# Patient Record
Sex: MALE | Race: WHITE | Employment: PART TIME | URBAN - METROPOLITAN AREA
[De-identification: names, ages, dates, MRNs, and addresses within clinical notes are randomized per-mention and may not be internally consistent; named-entity substitution may affect disease eponyms.]

---

## 2022-08-30 ENCOUNTER — HOSPITAL ENCOUNTER (EMERGENCY)
Age: 47
Discharge: PSYCHIATRIC OR SUBSTANCE USE FACILITY | End: 2022-08-30
Attending: EMERGENCY MEDICINE

## 2022-08-30 VITALS
HEIGHT: 76 IN | RESPIRATION RATE: 18 BRPM | WEIGHT: 307 LBS | DIASTOLIC BLOOD PRESSURE: 56 MMHG | TEMPERATURE: 98.5 F | OXYGEN SATURATION: 97 % | HEART RATE: 66 BPM | BODY MASS INDEX: 37.38 KG/M2 | SYSTOLIC BLOOD PRESSURE: 120 MMHG

## 2022-08-30 DIAGNOSIS — F31.81 BIPOLAR II DISORDER (HCC): Primary | ICD-10-CM

## 2022-08-30 DIAGNOSIS — R45.851 SUICIDAL THOUGHTS: ICD-10-CM

## 2022-08-30 DIAGNOSIS — I10 ESSENTIAL HYPERTENSION: ICD-10-CM

## 2022-08-30 DIAGNOSIS — F41.0 PANIC DISORDER WITHOUT AGORAPHOBIA: ICD-10-CM

## 2022-08-30 LAB
ALBUMIN SERPL-MCNC: 4.4 G/DL (ref 3.5–5)
ALBUMIN/GLOB SERPL: 1.3 {RATIO} (ref 1.2–3.5)
ALP SERPL-CCNC: 76 U/L (ref 50–136)
ALT SERPL-CCNC: 28 U/L (ref 12–65)
AMPHET UR QL SCN: NEGATIVE
ANION GAP SERPL CALC-SCNC: 5 MMOL/L (ref 7–16)
APPEARANCE UR: CLEAR
AST SERPL-CCNC: 18 U/L (ref 15–37)
BARBITURATES UR QL SCN: NEGATIVE
BASOPHILS # BLD: 0 K/UL (ref 0–0.2)
BASOPHILS NFR BLD: 1 % (ref 0–2)
BENZODIAZ UR QL: NEGATIVE
BILIRUB SERPL-MCNC: 0.7 MG/DL (ref 0.2–1.1)
BILIRUB UR QL: NEGATIVE
BUN SERPL-MCNC: 15 MG/DL (ref 6–23)
CALCIUM SERPL-MCNC: 9.4 MG/DL (ref 8.3–10.4)
CANNABINOIDS UR QL SCN: NEGATIVE
CHLORIDE SERPL-SCNC: 107 MMOL/L (ref 98–107)
CO2 SERPL-SCNC: 28 MMOL/L (ref 21–32)
COCAINE UR QL SCN: NEGATIVE
COLOR UR: ABNORMAL
CREAT SERPL-MCNC: 1.04 MG/DL (ref 0.8–1.5)
DIFFERENTIAL METHOD BLD: ABNORMAL
EOSINOPHIL # BLD: 0 K/UL (ref 0–0.8)
EOSINOPHIL NFR BLD: 1 % (ref 0.5–7.8)
ERYTHROCYTE [DISTWIDTH] IN BLOOD BY AUTOMATED COUNT: 13 % (ref 11.9–14.6)
ETHANOL SERPL-MCNC: <3 MG/DL (ref 0–0.08)
GLOBULIN SER CALC-MCNC: 3.5 G/DL (ref 2.3–3.5)
GLUCOSE SERPL-MCNC: 96 MG/DL (ref 65–100)
GLUCOSE UR STRIP.AUTO-MCNC: NEGATIVE MG/DL
HCT VFR BLD AUTO: 50.8 % (ref 41.1–50.3)
HGB BLD-MCNC: 17.2 G/DL (ref 13.6–17.2)
HGB UR QL STRIP: NEGATIVE
IMM GRANULOCYTES # BLD AUTO: 0 K/UL (ref 0–0.5)
IMM GRANULOCYTES NFR BLD AUTO: 0 % (ref 0–5)
KETONES UR QL STRIP.AUTO: 15 MG/DL
LEUKOCYTE ESTERASE UR QL STRIP.AUTO: NEGATIVE
LYMPHOCYTES # BLD: 1.4 K/UL (ref 0.5–4.6)
LYMPHOCYTES NFR BLD: 19 % (ref 13–44)
MCH RBC QN AUTO: 30.7 PG (ref 26.1–32.9)
MCHC RBC AUTO-ENTMCNC: 33.9 G/DL (ref 31.4–35)
MCV RBC AUTO: 90.6 FL (ref 79.6–97.8)
METHADONE UR QL: NEGATIVE
MONOCYTES # BLD: 0.4 K/UL (ref 0.1–1.3)
MONOCYTES NFR BLD: 6 % (ref 4–12)
NEUTS SEG # BLD: 5.4 K/UL (ref 1.7–8.2)
NEUTS SEG NFR BLD: 74 % (ref 43–78)
NITRITE UR QL STRIP.AUTO: NEGATIVE
NRBC # BLD: 0 K/UL (ref 0–0.2)
OPIATES UR QL: NEGATIVE
PCP UR QL: NEGATIVE
PH UR STRIP: 7 [PH] (ref 5–9)
PLATELET # BLD AUTO: 266 K/UL (ref 150–450)
PMV BLD AUTO: 9.1 FL (ref 9.4–12.3)
POTASSIUM SERPL-SCNC: 4 MMOL/L (ref 3.5–5.1)
PROT SERPL-MCNC: 7.9 G/DL (ref 6.3–8.2)
PROT UR STRIP-MCNC: NEGATIVE MG/DL
RBC # BLD AUTO: 5.61 M/UL (ref 4.23–5.6)
SARS-COV-2 RDRP RESP QL NAA+PROBE: NOT DETECTED
SODIUM SERPL-SCNC: 140 MMOL/L (ref 136–145)
SOURCE: NORMAL
SP GR UR REFRACTOMETRY: 1.02 (ref 1–1.02)
UROBILINOGEN UR QL STRIP.AUTO: 1 EU/DL (ref 0.2–1)
WBC # BLD AUTO: 7.3 K/UL (ref 4.3–11.1)

## 2022-08-30 PROCEDURE — 6370000000 HC RX 637 (ALT 250 FOR IP): Performed by: EMERGENCY MEDICINE

## 2022-08-30 PROCEDURE — 80053 COMPREHEN METABOLIC PANEL: CPT

## 2022-08-30 PROCEDURE — 80307 DRUG TEST PRSMV CHEM ANLYZR: CPT

## 2022-08-30 PROCEDURE — 81003 URINALYSIS AUTO W/O SCOPE: CPT

## 2022-08-30 PROCEDURE — 93005 ELECTROCARDIOGRAM TRACING: CPT | Performed by: EMERGENCY MEDICINE

## 2022-08-30 PROCEDURE — 99285 EMERGENCY DEPT VISIT HI MDM: CPT

## 2022-08-30 PROCEDURE — 82077 ASSAY SPEC XCP UR&BREATH IA: CPT

## 2022-08-30 PROCEDURE — 87635 SARS-COV-2 COVID-19 AMP PRB: CPT

## 2022-08-30 PROCEDURE — 85025 COMPLETE CBC W/AUTO DIFF WBC: CPT

## 2022-08-30 RX ORDER — CLONAZEPAM 1 MG/1
0.5 TABLET ORAL NIGHTLY
Status: DISCONTINUED | OUTPATIENT
Start: 2022-08-30 | End: 2022-08-30 | Stop reason: HOSPADM

## 2022-08-30 RX ORDER — HYDROXYZINE HYDROCHLORIDE 10 MG/1
10 TABLET, FILM COATED ORAL 3 TIMES DAILY PRN
COMMUNITY

## 2022-08-30 RX ORDER — OLANZAPINE 2.5 MG/1
2.5 TABLET ORAL
Status: COMPLETED | OUTPATIENT
Start: 2022-08-30 | End: 2022-08-30

## 2022-08-30 RX ORDER — OLANZAPINE 2.5 MG/1
2.5 TABLET ORAL NIGHTLY
Status: DISCONTINUED | OUTPATIENT
Start: 2022-08-30 | End: 2022-08-30 | Stop reason: HOSPADM

## 2022-08-30 RX ORDER — METHYLPHENIDATE HYDROCHLORIDE 5 MG/1
20 TABLET ORAL
Status: DISCONTINUED | OUTPATIENT
Start: 2022-09-01 | End: 2022-08-30 | Stop reason: HOSPADM

## 2022-08-30 RX ORDER — FLUOXETINE HYDROCHLORIDE 20 MG/1
20 CAPSULE ORAL DAILY
Status: DISCONTINUED | OUTPATIENT
Start: 2022-08-31 | End: 2022-08-30 | Stop reason: HOSPADM

## 2022-08-30 RX ORDER — FLUOXETINE 10 MG/1
50 TABLET, FILM COATED ORAL DAILY
COMMUNITY

## 2022-08-30 RX ORDER — FLUOXETINE HYDROCHLORIDE 20 MG/1
20 CAPSULE ORAL ONCE
Status: COMPLETED | OUTPATIENT
Start: 2022-08-30 | End: 2022-08-30

## 2022-08-30 RX ORDER — METHYLPHENIDATE HYDROCHLORIDE 5 MG/1
10 TABLET ORAL EVERY MORNING
Status: DISCONTINUED | OUTPATIENT
Start: 2022-09-01 | End: 2022-08-30 | Stop reason: HOSPADM

## 2022-08-30 RX ORDER — METHYLPHENIDATE HYDROCHLORIDE 5 MG/1
10 TABLET ORAL DAILY
Status: DISCONTINUED | OUTPATIENT
Start: 2022-09-01 | End: 2022-08-30 | Stop reason: HOSPADM

## 2022-08-30 RX ORDER — CLONAZEPAM 1 MG/1
0.5 TABLET ORAL EVERY 12 HOURS PRN
Status: DISCONTINUED | OUTPATIENT
Start: 2022-08-30 | End: 2022-08-30

## 2022-08-30 RX ORDER — ALPRAZOLAM 0.5 MG/1
0.5 TABLET ORAL
Status: COMPLETED | OUTPATIENT
Start: 2022-08-30 | End: 2022-08-30

## 2022-08-30 RX ADMIN — OLANZAPINE 2.5 MG: 2.5 TABLET, FILM COATED ORAL at 14:33

## 2022-08-30 RX ADMIN — FLUOXETINE 20 MG: 20 CAPSULE ORAL at 14:33

## 2022-08-30 RX ADMIN — ALPRAZOLAM 0.5 MG: 0.5 TABLET ORAL at 12:37

## 2022-08-30 ASSESSMENT — PAIN DESCRIPTION - DESCRIPTORS: DESCRIPTORS: TIGHTNESS

## 2022-08-30 ASSESSMENT — PATIENT HEALTH QUESTIONNAIRE - PHQ9: SUM OF ALL RESPONSES TO PHQ QUESTIONS 1-9: 24

## 2022-08-30 ASSESSMENT — PAIN DESCRIPTION - LOCATION: LOCATION: CHEST

## 2022-08-30 ASSESSMENT — PAIN DESCRIPTION - ORIENTATION: ORIENTATION: MID

## 2022-08-30 ASSESSMENT — PAIN SCALES - GENERAL: PAINLEVEL_OUTOF10: 3

## 2022-08-30 NOTE — CARE COORDINATION
Per staff pt is here b/c he was not feeling well while at [facility] and they had tested him for COVID. He was positive and they told him that he needed to quarantine for 5 days \"elsewhere\". Since pt is from [place] and has no family here he had no where else to go. In speaking with Mariam Delgado, another roommate had tested positive and they are testing them now. If there entire room is positive, then he would check with management to see if they could confine those 4 men to there apartment. Also, Omari SINHA is looking into his options. I called the [address] and they are full and would not have a stop for a +covid pt if they did have a bed.

## 2022-08-30 NOTE — ED NOTES
Patient has had consistent thought about hurting himself. He has a plan. He was going to go to [facility] for work by himself he didn't think he could keep himself from shooting himself, he has access to a gun.       Brian Young RN  08/30/22 1015

## 2022-08-30 NOTE — ED NOTES
[facility] consult entered.  [address]  08/30/22 9362

## 2022-08-30 NOTE — ED NOTES
TRANSFER - OUT REPORT:    Verbal report given to Deanna on Miriam Lava  being transferred to [facility] for routine progression of patient care       Report consisted of patient's Situation, Background, Assessment and   Recommendations(SBAR). Information from the following report(s) Nurse Handoff Report was reviewed with the receiving nurse. Lines:       Opportunity for questions and clarification was provided.       Patient transported with:  Rell Chairez RN  08/30/22 6844

## 2022-08-30 NOTE — ED PROVIDER NOTES
Assumed care of patient at 1600 hrs.     Patient is excepted at [facility]  Dr. Niurka Couch    Patient is currently on involuntary commitment papers         Justin Flores MD  08/30/22 6680

## 2022-08-30 NOTE — ED PROVIDER NOTES
[employer] Emergency Department Provider Note                   PCP:                None Provider               Age: 41 y.o. Sex: male       ICD-10-CM    1. Bipolar II disorder ([facility] [address]  F31.81       2. Panic disorder without agoraphobia  F41.0       3. Essential hypertension  I10       4.  Suicidal thoughts  R45.851           Esther Roberts 16 08/30/2022 11:17:32 AM       MDM  Number of Diagnoses or Management Options  Bipolar II disorder ([facility] [address]  Essential hypertension  Panic disorder without agoraphobia  Suicidal thoughts  Diagnosis management comments: Suicidal ideations, homicidal ideations, self-inflicted injury,    Schizophrenia, schizoaffective disorder, personality disorder, major depression,   depression with anxiety, depression reactive to situation, depression, anxiety, panic  attack, hyperventilation syndrome, bipolar disorder,    Substance abuse, medication noncompliance, drug abuse, alcohol abuse, alcohol  intoxication,    Eating disorder, bulimia nervosa, anorexia nervosa, adjustment reaction         Amount and/or Complexity of Data Reviewed  Clinical lab tests: ordered and reviewed  Tests in the medicine section of CPT®: reviewed and ordered  Review and summarize past medical records: yes         Orders Placed This Encounter   Procedures    Critical Care    COVID-19, Rapid    Comprehensive Metabolic Panel    CBC with Auto Differential    Urinalysis w rflx microscopic    Urine Drug Screen    Ethanol    EKG 12 Lead        Medications   FLUoxetine (PROZAC) capsule 20 mg (has no administration in time range)   OLANZapine (ZYPREXA) tablet 2.5 mg (has no administration in time range)   OLANZapine (ZYPREXA) tablet 2.5 mg (has no administration in time range)   FLUoxetine (PROZAC) capsule 20 mg (has no administration in time range)   clonazePAM (KLONOPIN) tablet 0.5 mg (has no administration in time range)   methylphenidate (RITALIN) tablet 10 mg (has no administration in time range) methylphenidate (RITALIN) tablet 20 mg (has no administration in time range)   methylphenidate (RITALIN) tablet 10 mg (has no administration in time range)   ALPRAZolam Jones Latus) tablet 0.5 mg (0.5 mg Oral Given 8/30/22 1237)       New Prescriptions    No medications on file        Karla Kowalski is a 41 y.o. male who presents to the Emergency Department with chief complaint of    Chief Complaint   Patient presents with    Mental Health Problem      30-year-old male presenting to the emergency department today complaining of persistent daily thoughts of killing himself. The patient has had a long history of depression and in his 25s he had a history of drug abuse and tried to overdose 1 time. The patient states that he was placed on Prozac in November but it has not seemed to help. He has been on Celexa previously but that has been several years ago. He denies any tobacco alcohol or recreational drug use currently. He has had several life event changes in the last couple of years and the additional stress of the pandemic with isolation has made things worse as well. All other systems reviewed and are negative unless otherwise stated in the history of present illness section. Review of Systems   Psychiatric/Behavioral:  Positive for self-injury. The patient is nervous/anxious. All other systems reviewed and are negative. Past Medical History:   Diagnosis Date    Depression         History reviewed. No pertinent surgical history. History reviewed. No pertinent family history. Social History     Socioeconomic History    Marital status:      Spouse name: None    Number of children: None    Years of education: None    Highest education level: None        Allergies: Patient has no known allergies.     Previous Medications    FLUOXETINE (PROZAC) 10 MG TABLET    Take 50 mg by mouth daily    HYDROXYZINE HCL (ATARAX) 10 MG TABLET    Take 10 mg by mouth 3 times daily as needed for Itching Vitals signs and nursing note reviewed. Patient Vitals for the past 4 hrs:   Temp Pulse Resp BP SpO2   08/30/22 1042 -- -- -- (!) 157/82 95 %   08/30/22 1041 -- 66 -- -- --   08/30/22 1010 98.5 °F (36.9 °C) 88 18 (!) 156/98 97 %          Physical Exam     GENERAL:The patient has Body mass index is 37.37 kg/m². Well-hydrated. VITAL SIGNS: Heart rate, blood pressure, respiratory rate reviewed as recorded in  nurse's notes  EYES: Pupils reactive. Extraocular motion intact. No conjunctival redness or drainage. EARS: No external masses or lesions. NOSE: No nasal drainage or epistaxis. MOUTH/THROAT: Pharynx clear; airway patent. NECK: Supple, no meningeal signs. Trachea midline. No masses or thyromegaly. LUNGS: Breath sounds clear and equal bilaterally no accessory muscle use. CHEST: No deformity  CARDIOVASCULAR: Regular rate and rhythm  EXTREMITIES: No clubbing or cyanosis. No joint swelling. Normal muscle tone. No  restricted range of motion appreciated. NEUROLOGIC: Sensation is grossly intact. Cranial nerve exam reveals face is  symmetrical, tongue is midline speech is clear. SKIN: No rash or petechiae. Good skin turgor palpated. PSYCHIATRIC: Alert and oriented. Tearful expressing thoughts of self-harm. Critical Care    Date/Time: 8/30/2022 1:55 PM  Performed by: Piter Pro DO  Authorized by: Piter Pro DO     Comments:      Critical care time: 79 minutes of critical care time was performed in the emergency department. This was separate from any other procedures listed during the patients emergency department course. The failure to initiate these interventions on an urgent basis would likely have resulted in sudden, clinically significant or life-threatening deterioration in the patients condition.       ED EKG Interpretation  EKG was interpreted in the absence of a cardiologist.    Rate: Rate: Bradycardia  EKG Interpretation: EKG Interpretation: sinus rhythm  ST Segments: Normal ST segments - NO STEMI    [unfilled]     No orders to display                      ED Course as of 08/30/22 1599   e Aug 30, 2022   1337 Dr. Lorraine Cotton talked to the patient and recommends inpatient care. Patient is agreeable with inpatient psychiatric treatment. He recommended Prozac 20 mg daily and Zyprexa 2.5 mg daily. Patient is also to be given Klonopin 0.5 mg every 12 as needed [KH]      ED Course User Index  [KH] Melanie Reynoso DO        Voice dictation software was used during the making of this note. This software is not perfect and grammatical and other typographical errors may be present. This note has not been completely proofread for errors.        Melanie Reynoso DO  08/30/22 3179

## 2022-08-30 NOTE — ED NOTES
ED provider Merline Alexander DO,  indicates no sitter needed at this time.       Kassy Barber RN  08/30/22 5151

## 2022-08-31 LAB
EKG ATRIAL RATE: 56 BPM
EKG DIAGNOSIS: NORMAL
EKG P AXIS: 43 DEGREES
EKG P-R INTERVAL: 128 MS
EKG Q-T INTERVAL: 624 MS
EKG QRS DURATION: 78 MS
EKG QTC CALCULATION (BAZETT): 602 MS
EKG R AXIS: 19 DEGREES
EKG T AXIS: 31 DEGREES
EKG VENTRICULAR RATE: 56 BPM

## 2022-08-31 NOTE — ED NOTES
Constant Observer No   Constant Observer Oriented no   High risk patients are in line of sight at all times No - moderate risk    Excess equipment/medical supplies not necessary for the care of the patient removed No - moderate risk    All sharp or dangerous objects are removed from room: including but not limited to belts, pens & pencils, needles, medications, cosmetics, lighters, matches, nail files, watches, necklaces, glass objects, razors, razor blades, knives, aerosol sprays, drawstring pants, shoes, cords (telephone, call bells, etc.) cleaning wipes or other cleaning items, aluminum cans, not permanently attached wall décor No - moderate risk    Telephone/cell phone removed as well as TV remote (batteries can be swallowed) No - moderate risk   Patient belongings removed and labeled at nurses station No - moderate risk    Excess linen is removed from room No - moderate risk    All plastic bags are removed from the room and replaced with paper trash bags No - moderate risk    Patient is in paper scrubs or appropriate gown and using hospital socks with rubber soles No - moderate risk    No metal, hard eating utensils or hard plates are on meal tray No - moderate risk    Remove all cleaning agents used by Ilya's No - moderate risk    If Crucifix is hanging on a nail, remove Crucifix as well as the nail Yes        *If any question above is answered \"No,\" documentation is required.         Courtney Lopes, [facility]  08/30/22 2037

## 2022-08-31 NOTE — PROGRESS NOTES
Pt came in c/o thoughts of harming self with a thought of driving to [facility] [place] and shooting self. He does have access to a gun. Pt with long Hx of depression and in his 19's he had a Hx of drug abuse w/ OD x1. PT placed on Prozac in Nov, but claims that it \"doesn't seem to help\". 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 Pt had never been a inpatient prior. Psych to see. Psych saw and feels pt needs inpatient psychiatry care. I have faxed clinicals to [facility], [address] [place] [address], [facility] and [facility]. Informed facilities to please consider pt for there \"CARES Act Funds\"   Pt informed of the plan and is willing to be voluntarily at this moment. Will continue to up date him. @ [address] is full, [facility] are reviewing. Vaishali needed me to fax info again. [address] is contacting Milton Cagle for approval for funds, per David Hassan new guidelines is pt must be on commitment papers to be considered. Pt on IVC papers. Confirmed that pt is staying with family in [place], currently has a residence in [place]. He plans to move to [place] in the next week. He has provided his mothers [place] address for this reason. @[phone] Received a call at home from Debra/Admissions @ [address] [facility] Carlo Marie has accepted pt to Adult Unit B. Pt was granted the [place]. The bed is ready now and can be transferred when we are ready. I informed the ER staff and [facility] was called for transportation. Per RN note this am pt was transported at 2053.